# Patient Record
Sex: FEMALE | Race: BLACK OR AFRICAN AMERICAN | NOT HISPANIC OR LATINO
[De-identification: names, ages, dates, MRNs, and addresses within clinical notes are randomized per-mention and may not be internally consistent; named-entity substitution may affect disease eponyms.]

---

## 2024-02-14 ENCOUNTER — TRANSCRIPTION ENCOUNTER (OUTPATIENT)
Age: 89
End: 2024-02-14

## 2024-02-15 ENCOUNTER — TRANSCRIPTION ENCOUNTER (OUTPATIENT)
Age: 89
End: 2024-02-15

## 2024-02-16 ENCOUNTER — APPOINTMENT (OUTPATIENT)
Dept: CARE COORDINATION | Facility: HOME HEALTH | Age: 89
End: 2024-02-16
Payer: MEDICARE

## 2024-02-16 DIAGNOSIS — H40.9 UNSPECIFIED GLAUCOMA: ICD-10-CM

## 2024-02-16 DIAGNOSIS — I25.10 ATHEROSCLEROTIC HEART DISEASE OF NATIVE CORONARY ARTERY W/OUT ANGINA PECTORIS: ICD-10-CM

## 2024-02-16 DIAGNOSIS — J18.9 PNEUMONIA, UNSPECIFIED ORGANISM: ICD-10-CM

## 2024-02-16 PROBLEM — Z00.00 ENCOUNTER FOR PREVENTIVE HEALTH EXAMINATION: Status: ACTIVE | Noted: 2024-01-01

## 2024-02-16 PROCEDURE — 99349 HOME/RES VST EST MOD MDM 40: CPT

## 2024-02-16 NOTE — CURRENT MEDS
[Takes medication as prescribed] : takes [None] : Patient does not have any barriers to medication adherence [Yes] : Reviewed medication list for presence of high-risk medications. [FreeTextEntry1] : Medication administered by daughter and HHA

## 2024-02-16 NOTE — ASSESSMENT
[FreeTextEntry1] : Patient is a 89 year old female enrolled in the [facility] program with a history of CAD, CHF, DM Type II, HTN, Glaucoma w/ legal blindness, GERD, b/l deafness, Dementia, s/p peg tube on TFs. Patient was admitted to [facility] for CHF/PNA.    Hospital chart reviewed and copied as per [facility] Discharge Summary: "89 y/o f w/ PMHx of CAD, CHF, DM Type II, HTN, Glaucoma w/ legal blindness, GERD, b/l deafness, Dementia, s/p peg tube on TFs, presents from home in setting of worsening cough and mucus buildup. Baseline status is non-verbal and bedbound requiring full assistance.  Patient currently lives a home with HHA.  Daughter reports no prior discussion about goals of care or DNR/DNI status with her  mother. Patient is now admitted to medicine in the setting of possible multifactorial metabolic encephalopathy in setting of hyponatremia, UTI and RSV pneumonia.  UCx grew GBS and she was transitioned from ceftriaxone to amoxicillin. She should complete in AM of 2/15. RSV treated with supportive care. For hyponatremia, Na improved.  For hyponatremia, pt recommended to have blood drawn to ensure sodium levels stay within normal range. Patient pulled out her PEG tube and it was replaced by GI on 2/14.  For patient's stage IV sacral decub, she had wound care.  Seen by nutrition for tube feeding.  Patient has HHA and a doctor that does visits to the house."  Patient observed via home visit and is alert and oriented x 0, in no acute distress. Patient observe lying comfortably in recliner chair with HHA present at time of visit. Patient is nonverbal and bed/chair bound at baseline. Patient lives with her daughter and has 24 hrs HHA care. HHA states patient is in her usual, pre-hospital, state of health. Reports no issues with PEG tube feedings or medications. States the patient has been coughing less. HHA states they use vibrating Vest therapy twice a day to help break up secretions. Denies any fever, chills, abdominal pain, palpitations, nausea, vomiting, diarrhea, lightheadedness, dizziness, shortness of breath, or chest pain.

## 2024-02-16 NOTE — REVIEW OF SYSTEMS
[Fever] : no fever [Fatigue] : fatigue [Vision Problems] : vision problems [Lower Ext Edema] : no lower extremity edema [Shortness Of Breath] : no shortness of breath [Cough] : cough [Constipation] : no constipation [Diarrhea] : diarrhea [Melena] : no melena [Incontinence] : incontinence [Skin Rash] : no skin rash [Confusion] : confusion [FreeTextEntry2] : limited due to patient's hx of dementia and non verbal at baseline  [FreeTextEntry3] : legally blind 22 glaucoma  [FreeTextEntry4] : B/L deafness

## 2024-02-16 NOTE — PHYSICAL EXAM
[No Acute Distress] : no acute distress [Ill-Appearing] : ill-appearing [Cachectic] : cachexia was observed [No JVD] : no jugular venous distention [No Respiratory Distress] : no respiratory distress  [No Accessory Muscle Use] : no accessory muscle use [Normal Rate] : normal rate  [Regular Rhythm] : with a regular rhythm [Normal S1, S2] : normal S1 and S2 [No Edema] : there was no peripheral edema [Soft] : abdomen soft [Non-distended] : non-distended [No Joint Swelling] : no joint swelling [No Rash] : no rash [de-identified] : +glaucoma, legally blind  [de-identified] : B/L deafness  [de-identified] : diminished lung sounds bilaterally  [de-identified] : + PEG  [de-identified] : Bed/Chair bound  [de-identified] : Non verbal at baseline, hx dementia

## 2024-02-16 NOTE — HISTORY OF PRESENT ILLNESS
[FreeTextEntry1] : Follow-up for discharge from [facility] for CHF/PNA.  [de-identified] : Patient is a 89 year old female enrolled in the [facility] program with a history of CAD, CHF, DM Type II, HTN, Glaucoma w/ legal blindness, GERD, b/l deafness, Dementia, s/p peg tube on TFs. Patient was admitted to [facility] for CHF/PNA.    Hospital chart reviewed and copied as per [facility] Discharge Summary: "89 y/o f w/ PMHx of CAD, CHF, DM Type II, HTN, Glaucoma w/ legal blindness, GERD, b/l deafness, Dementia, s/p peg tube on TFs, presents from home in setting of worsening cough and mucus buildup. Baseline status is non-verbal and bedbound requiring full assistance.  Patient currently lives a home with HHA.  Daughter reports no prior discussion about goals of care or DNR/DNI status with her  mother. Patient is now admitted to medicine in the setting of possible multifactorial metabolic encephalopathy in setting of hyponatremia, UTI and RSV pneumonia.  UCx grew GBS and she was transitioned from ceftriaxone to amoxicillin. She should complete in AM of 2/15. RSV treated with supportive care. For hyponatremia, Na improved.  For hyponatremia, pt recommended to have blood drawn to ensure sodium levels stay within normal range. Patient pulled out her PEG tube and it was replaced by GI on 2/14.  For patient's stage IV sacral decub, she had wound care.  Seen by nutrition for tube feeding.  Patient has HHA and a doctor that does visits to the house."  Patient observed via home visit and is alert and oriented x 0, in no acute distress. Patient observe lying comfortably in recliner chair with HHA present at time of visit. Patient is nonverbal and bed/chair bound at baseline. Patient lives with her daughter and has 24 hrs HHA care. HHA states patient is in her usual, pre-hospital, state of health. Reports no issues with PEG tube feedings or medications. States the patient has been coughing less. HHA states they use vibrating Vest therapy twice a day to help break up secretions. Denies any fever, chills, abdominal pain, palpitations, nausea, vomiting, diarrhea, lightheadedness, dizziness, shortness of breath, or chest pain.

## 2024-02-21 ENCOUNTER — TRANSCRIPTION ENCOUNTER (OUTPATIENT)
Age: 89
End: 2024-02-21

## 2024-02-28 ENCOUNTER — TRANSCRIPTION ENCOUNTER (OUTPATIENT)
Age: 89
End: 2024-02-28

## 2024-03-06 ENCOUNTER — TRANSCRIPTION ENCOUNTER (OUTPATIENT)
Age: 89
End: 2024-03-06

## 2024-03-11 ENCOUNTER — APPOINTMENT (OUTPATIENT)
Dept: HOME HEALTH SERVICES | Facility: HOME HEALTH | Age: 89
End: 2024-03-11
Payer: MEDICARE

## 2024-03-11 VITALS
SYSTOLIC BLOOD PRESSURE: 138 MMHG | DIASTOLIC BLOOD PRESSURE: 70 MMHG | OXYGEN SATURATION: 94 % | HEART RATE: 69 BPM | TEMPERATURE: 97.7 F | RESPIRATION RATE: 16 BRPM

## 2024-03-11 DIAGNOSIS — E87.1 HYPO-OSMOLALITY AND HYPONATREMIA: ICD-10-CM

## 2024-03-11 DIAGNOSIS — R13.10 DYSPHAGIA, UNSPECIFIED: ICD-10-CM

## 2024-03-11 DIAGNOSIS — R39.81 FUNCTIONAL URINARY INCONTINENCE: ICD-10-CM

## 2024-03-11 DIAGNOSIS — N39.0 URINARY TRACT INFECTION, SITE NOT SPECIFIED: ICD-10-CM

## 2024-03-11 DIAGNOSIS — Z78.9 OTHER SPECIFIED HEALTH STATUS: ICD-10-CM

## 2024-03-11 PROCEDURE — 99344 HOME/RES VST NEW MOD MDM 60: CPT

## 2024-03-11 PROCEDURE — G0506: CPT

## 2024-03-11 RX ORDER — CARVEDILOL 12.5 MG/1
12.5 TABLET, FILM COATED ORAL TWICE DAILY
Refills: 0 | Status: ACTIVE | COMMUNITY

## 2024-03-11 RX ORDER — BRIMONIDINE TARTRATE, TIMOLOL MALEATE 2; 5 MG/ML; MG/ML
0.2-0.5 SOLUTION/ DROPS OPHTHALMIC
Refills: 0 | Status: ACTIVE | COMMUNITY
Start: 2024-03-11

## 2024-03-11 RX ORDER — ALBUTEROL SULFATE 1.25 MG/3ML
1.25 SOLUTION RESPIRATORY (INHALATION)
Refills: 0 | Status: ACTIVE | COMMUNITY
Start: 2024-03-11

## 2024-03-11 RX ORDER — FUROSEMIDE 40 MG/1
40 TABLET ORAL
Qty: 90 | Refills: 0 | Status: ACTIVE | COMMUNITY

## 2024-03-11 RX ORDER — INSULIN ASPART 100 [IU]/ML
INJECTION, SOLUTION INTRAVENOUS; SUBCUTANEOUS
Refills: 0 | Status: DISCONTINUED | COMMUNITY
End: 2024-03-11

## 2024-03-11 RX ORDER — INSULIN GLARGINE 100 [IU]/ML
100 INJECTION, SOLUTION SUBCUTANEOUS
Qty: 5 | Refills: 0 | Status: ACTIVE | COMMUNITY
Start: 2024-03-11

## 2024-03-11 RX ORDER — LOSARTAN POTASSIUM 100 MG/1
100 TABLET, FILM COATED ORAL DAILY
Refills: 0 | Status: ACTIVE | COMMUNITY

## 2024-03-29 NOTE — REASON FOR VISIT
[Initial Evaluation] : an initial evaluation [Family Member] : family member [Formal Caregiver] : formal caregiver [Pre-Visit Preparation] : pre-visit preparation was done [FreeTextEntry1] : pt is being evaluated for enrollment into [facility]

## 2024-03-29 NOTE — HEALTH RISK ASSESSMENT
[Full assistance needed] : managing finances [No falls in past year] : Patient reported no falls in the past year [Yes] : The patient does have visual impairment [TimeGetUpGo] : 0

## 2024-03-29 NOTE — ADDENDUM
[FreeTextEntry1] : As per discussion with patient's home care RN who was doing her wound care: Pt with stage IV sacral pressure wound measuring 2.9 cm x 8 cm x 0.8 cm 100% granulation tissue Moderate serosanguinous pink drainage Macerated periphery. Wound care: Cleanse with normal Saline. Triad to periphery. Aquacel AG to wound bed Cover with foam adhesive. Change every other day. Supply needed: Allevyn 17.2cm x 17.5 cm Saline Caviloion spray Gauze 4x4 Aquacel 4 x 5 inches Wound care discharged patient and daughter will further take care of the wound.

## 2024-03-29 NOTE — COUNSELING
[Non - Smoker] : non-smoker [Decrease hospital use] : decrease hospital use [Improve pain control] : improve pain control [Decrease stress] : decrease stress [Discussed disease trajectory with patient/caregiver] : discussed disease trajectory with patient/caregiver [Minimize unnecessary interventions] : minimize unnecessary interventions [Likely to achieve goals/desired outcomes] : likely to achieve goals/desired outcomes [Patient/Caregiver has ___ understanding of disease process] : patient/caregiver has [unfilled] understanding of disease process [Full Code] : Code Status: Full Code [No Limitations] : Treatment Guidelines: No limitations [ - New patient with 2 or more chronic conditions; CCM discussed and patient-centered care plan established] : New patient with 2 or more chronic conditions; CCM discussed and patient-centered care plan established [FreeTextEntry3] : tube feeds [de-identified] :  MOLTS form discussed with patient's daughter at [facility]. She does not want to fill out the form as she wants to discuss it with other family members. She is not sure if she wantsher to be full code vs DNR even though we discussed that given pt's functional status, Full code  will likely make her funtional status worse if she survives.

## 2024-03-29 NOTE — HISTORY OF PRESENT ILLNESS
[Family Member] : family member [FreeTextEntry1] : bedbound [FreeTextEntry2] : Patient is a 89-year-old woman with PMH significant for HTN, CAD, CHF, Arthritis, GERD, Severe dementia (bedbound nonverbal), Dysphagia s/p PEG tube, DM, Glaucoma (legally blind), deaf, Reactive airway disease is being evaluated for enrollment into [facility] program.  Patient was recently discharged from [facility] where she was treated for Hyponatremia, RSV and UTI. She was noted to have sacral stage IV Pressure ulcers which is being treated by visiting nurses. She is being fed via PEG tube with Fiber Source HN 1.2 (250 ml x 5 times a day with 75 cc of water before and after meal) She has regular BM's.  Getting Vibrating Vest therapy twice a day to break up secretions.  Daughter tates that pt had a bad reaction to Flu shot in the past and she does not want her to have any.

## 2024-03-29 NOTE — CHRONIC CARE ASSESSMENT
[Limited decision making ability] : limited decision making ability [de-identified] : pt is being fed via PEG tube [de-identified] : N/A

## 2024-04-08 RX ORDER — PANTOPRAZOLE 20 MG/1
20 TABLET, DELAYED RELEASE ORAL DAILY
Qty: 30 | Refills: 3 | Status: ACTIVE | COMMUNITY
Start: 1900-01-01 | End: 1900-01-01

## 2024-04-08 RX ORDER — BIMATOPROST 0.1 MG/ML
0.01 SOLUTION/ DROPS OPHTHALMIC DAILY
Qty: 1 | Refills: 3 | Status: ACTIVE | COMMUNITY
Start: 2024-03-11 | End: 1900-01-01

## 2024-04-08 RX ORDER — MONTELUKAST 10 MG/1
10 TABLET, FILM COATED ORAL
Qty: 90 | Refills: 2 | Status: ACTIVE | COMMUNITY
Start: 1900-01-01 | End: 1900-01-01

## 2024-04-19 ENCOUNTER — APPOINTMENT (OUTPATIENT)
Dept: HOME HEALTH SERVICES | Facility: HOME HEALTH | Age: 89
End: 2024-04-19

## 2024-04-19 VITALS
HEART RATE: 86 BPM | DIASTOLIC BLOOD PRESSURE: 66 MMHG | RESPIRATION RATE: 16 BRPM | SYSTOLIC BLOOD PRESSURE: 124 MMHG | TEMPERATURE: 98 F | OXYGEN SATURATION: 98 %

## 2024-04-19 RX ORDER — FOLIC ACID 1 MG/1
1 TABLET ORAL
Qty: 90 | Refills: 3 | Status: ACTIVE | COMMUNITY
Start: 2024-03-11 | End: 1900-01-01

## 2024-04-22 ENCOUNTER — NON-APPOINTMENT (OUTPATIENT)
Age: 89
End: 2024-04-22

## 2024-05-22 ENCOUNTER — APPOINTMENT (OUTPATIENT)
Dept: HOME HEALTH SERVICES | Facility: HOME HEALTH | Age: 89
End: 2024-05-22

## 2024-06-04 ENCOUNTER — APPOINTMENT (OUTPATIENT)
Dept: HOME HEALTH SERVICES | Facility: HOME HEALTH | Age: 89
End: 2024-06-04
Payer: MEDICARE

## 2024-06-04 VITALS
RESPIRATION RATE: 14 BRPM | HEART RATE: 82 BPM | TEMPERATURE: 97.6 F | DIASTOLIC BLOOD PRESSURE: 62 MMHG | OXYGEN SATURATION: 96 % | SYSTOLIC BLOOD PRESSURE: 115 MMHG

## 2024-06-04 DIAGNOSIS — I50.9 HEART FAILURE, UNSPECIFIED: ICD-10-CM

## 2024-06-04 DIAGNOSIS — K21.9 GASTRO-ESOPHAGEAL REFLUX DISEASE W/OUT ESOPHAGITIS: ICD-10-CM

## 2024-06-04 DIAGNOSIS — J45.909 UNSPECIFIED ASTHMA, UNCOMPLICATED: ICD-10-CM

## 2024-06-04 DIAGNOSIS — L89.154 PRESSURE ULCER OF SACRAL REGION, STAGE 4: ICD-10-CM

## 2024-06-04 DIAGNOSIS — F03.90 UNSPECIFIED DEMENTIA W/OUT BEHAVIORAL DISTURBANCE: ICD-10-CM

## 2024-06-04 DIAGNOSIS — I10 ESSENTIAL (PRIMARY) HYPERTENSION: ICD-10-CM

## 2024-06-04 DIAGNOSIS — E11.9 TYPE 2 DIABETES MELLITUS W/OUT COMPLICATIONS: ICD-10-CM

## 2024-06-04 DIAGNOSIS — Z79.4 TYPE 2 DIABETES MELLITUS W/OUT COMPLICATIONS: ICD-10-CM

## 2024-06-04 PROCEDURE — 99348 HOME/RES VST EST LOW MDM 30: CPT

## 2024-06-04 NOTE — PHYSICAL EXAM
[No Acute Distress] : no acute distress [Normal Sclera/Conjunctiva] : normal sclera/conjunctiva [No JVD] : no jugular venous distention [Supple] : the neck was supple [No Respiratory Distress] : no respiratory distress [Clear to Auscultation] : lungs were clear to auscultation bilaterally [No Accessory Muscle Use] : no accessory muscle use [Normal Rate] : heart rate was normal  [Regular Rhythm] : with a regular rhythm [Normal S1, S2] : normal S1 and S2 [No Edema] : there was no peripheral edema [Normal Bowel Sounds] : normal bowel sounds [Non Tender] : non-tender [Soft] : abdomen soft [No Joint Swelling] : no joint swelling seen [No Clubbing, Cyanosis] : no clubbing  or cyanosis of the fingernails [No Rash] : no rash [de-identified] : 2/6 systolic murmur [de-identified] : PEG tube site without drainage or erythema [de-identified] : sacrum pressure ulcer with wound vac [de-identified] : pt is unresponsive to verbal stimuli, nonverbal, deaf, legally blind, does not follow commands.  [de-identified] : intermittently agitated

## 2024-06-04 NOTE — REASON FOR VISIT
[Follow-Up] : a follow-up visit [FreeTextEntry1] : CHF, dementia, dysphagia, pressure ulcer stage IV, DM RAD, GERD

## 2024-06-04 NOTE — HISTORY OF PRESENT ILLNESS
[Family Member] : family member [FreeTextEntry1] : bedbound [FreeTextEntry2] : Patient is a 89-year-old woman with PMH significant for HTN, CAD, CHF, Arthritis, GERD, Severe dementia (bedbound nonverbal), Dysphagia s/p PEG tube, DM, Glaucoma (legally blind), deaf, Reactive airway disease is being seen for F/U. As per aide , patient has been doing well. No issues with PEG tube She has regular BM's.  Getting Vibrating Vest therapy twice a day to break up secretions.  Has Wound Vac placed  about 3-4 weeks ago with improvement in wound appearance

## 2024-06-19 ENCOUNTER — LABORATORY RESULT (OUTPATIENT)
Age: 89
End: 2024-06-19

## 2024-06-24 ENCOUNTER — RESULT REVIEW (OUTPATIENT)
Age: 89
End: 2024-06-24

## 2024-06-25 ENCOUNTER — NON-APPOINTMENT (OUTPATIENT)
Age: 89
End: 2024-06-25

## 2024-07-02 ENCOUNTER — RESULT REVIEW (OUTPATIENT)
Age: 89
End: 2024-07-02

## 2024-07-03 ENCOUNTER — TRANSCRIPTION ENCOUNTER (OUTPATIENT)
Age: 89
End: 2024-07-03